# Patient Record
Sex: FEMALE | Race: WHITE | NOT HISPANIC OR LATINO | Employment: FULL TIME | URBAN - METROPOLITAN AREA
[De-identification: names, ages, dates, MRNs, and addresses within clinical notes are randomized per-mention and may not be internally consistent; named-entity substitution may affect disease eponyms.]

---
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
## 2023-01-19 ENCOUNTER — CLINICAL SUPPORT (OUTPATIENT)
Dept: OBSTETRICS AND GYNECOLOGY | Facility: CLINIC | Age: 18
End: 2023-01-19
Payer: COMMERCIAL

## 2023-01-19 VITALS
WEIGHT: 185 LBS | DIASTOLIC BLOOD PRESSURE: 62 MMHG | SYSTOLIC BLOOD PRESSURE: 110 MMHG | BODY MASS INDEX: 29.73 KG/M2 | HEIGHT: 66 IN

## 2023-01-19 DIAGNOSIS — Z30.42 ENCOUNTER FOR SURVEILLANCE OF INJECTABLE CONTRACEPTIVE: Primary | ICD-10-CM

## 2023-01-19 RX ORDER — MEDROXYPROGESTERONE ACETATE 150 MG/ML
150 INJECTION, SUSPENSION INTRAMUSCULAR ONCE
Status: COMPLETED | OUTPATIENT
Start: 2023-01-19 | End: 2023-01-19

## 2023-01-19 RX ADMIN — MEDROXYPROGESTERONE ACETATE 150 MG: 150 INJECTION, SUSPENSION INTRAMUSCULAR at 15:05

## 2023-01-19 NOTE — PROGRESS NOTES
DepoProvera Contraception Note  Shalonda Oates is a 17 y.o. female here for her DepoProvera injection. Her LMP was 1 year ago. She weighs 185lbs and she is 5feet 6inches tall. Shalonda Oates is sexually active. The patient's last pap smear was none. Her last injection was 10/28/2022. Her past reactions to this injection include:none. It has been less than 12 weeks since her last injection. Her last annual exam was 08/23/2022.    Patient Questions  Have you ever passed out from an injection? No     Have you ever had a reaction to an injection? No    Are you allergic to any medications? No    Have you been sick in the last month? No    Injection Details  Noted in the MAR.    Next Injection  Her next injection is 04/24/2023.    Electronically Signed By:  Shania Jones RN  01/19/2023

## 2023-04-13 ENCOUNTER — CLINICAL SUPPORT (OUTPATIENT)
Dept: OBSTETRICS AND GYNECOLOGY | Facility: CLINIC | Age: 18
End: 2023-04-13
Payer: COMMERCIAL

## 2023-04-13 VITALS — HEIGHT: 66 IN | WEIGHT: 190 LBS | BODY MASS INDEX: 30.53 KG/M2

## 2023-04-13 DIAGNOSIS — Z30.42 ENCOUNTER FOR SURVEILLANCE OF INJECTABLE CONTRACEPTIVE: Primary | ICD-10-CM

## 2023-04-13 RX ORDER — MEDROXYPROGESTERONE ACETATE 150 MG/ML
150 INJECTION, SUSPENSION INTRAMUSCULAR ONCE
Status: COMPLETED | OUTPATIENT
Start: 2023-04-13 | End: 2023-04-13

## 2023-04-13 RX ADMIN — MEDROXYPROGESTERONE ACETATE 150 MG: 150 INJECTION, SUSPENSION INTRAMUSCULAR at 12:23

## 2023-04-13 NOTE — PROGRESS NOTES
DepoProvera Contraception Note  Shalonda Oates is a 17 y.o. female here for her DepoProvera injection. Her LMP was N/A. She weighs 190lbs and she is 5feet 6inches tall. Shalonda Oates is sexually active. The patient's last pap smear was N/A. Her last injection was 1/19/23. Her past reactions to this injection include:none. It has been less than 12 weeks since her last injection. Her last annual exam was 8/23/22.    Patient Questions  Have you ever passed out from an injection? No     Have you ever had a reaction to an injection? No    Are you allergic to any medications? No    Have you been sick in the last month? No    Injection Details  Noted in the MAR.    Next Injection  Her next injection is 7/6/23.    Electronically Signed By:  Janeth White RN  04/13/2023